# Patient Record
Sex: MALE | HISPANIC OR LATINO | URBAN - METROPOLITAN AREA
[De-identification: names, ages, dates, MRNs, and addresses within clinical notes are randomized per-mention and may not be internally consistent; named-entity substitution may affect disease eponyms.]

---
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
## 2022-02-02 NOTE — IMPRESSION/PLAN
Impression: Other chronic allergic conjunctivitis: H10.45. OU. Plan: Discussed diagnosis with patient. Recommend OTC oral antihistamine, and start Pazeo QD OU. Rub excess into lids.
0

## 2022-06-29 ENCOUNTER — OFFICE VISIT (OUTPATIENT)
Dept: URBAN - METROPOLITAN AREA CLINIC 81 | Facility: CLINIC | Age: 22
End: 2022-06-29
Payer: COMMERCIAL

## 2022-06-29 DIAGNOSIS — H52.13 MYOPIA, BILATERAL: Primary | ICD-10-CM

## 2022-06-29 PROCEDURE — 92014 COMPRE OPH EXAM EST PT 1/>: CPT | Performed by: OPTOMETRIST

## 2022-06-29 PROCEDURE — 92310 CONTACT LENS FITTING OU: CPT | Performed by: OPTOMETRIST

## 2022-06-29 ASSESSMENT — KERATOMETRY
OS: 43.38
OD: 42.38

## 2022-06-29 ASSESSMENT — INTRAOCULAR PRESSURE
OS: 12
OD: 14

## 2022-06-29 ASSESSMENT — VISUAL ACUITY
OS: 20/40
OD: 20/25

## 2022-06-29 NOTE — IMPRESSION/PLAN
Impression: Myopia, bilateral: H52.13. Plan: Discussed diagnosis with patient. Dispensed Rx for glasses and contacts to patient.

## 2023-10-31 ENCOUNTER — OFFICE VISIT (OUTPATIENT)
Dept: URBAN - METROPOLITAN AREA CLINIC 76 | Facility: CLINIC | Age: 23
End: 2023-10-31

## 2023-10-31 DIAGNOSIS — H52.13 MYOPIA, BILATERAL: Primary | ICD-10-CM

## 2023-10-31 PROCEDURE — 92310 CONTACT LENS FITTING OU: CPT | Performed by: OPTOMETRIST

## 2023-10-31 PROCEDURE — 92012 INTRM OPH EXAM EST PATIENT: CPT | Performed by: OPTOMETRIST

## 2023-10-31 ASSESSMENT — KERATOMETRY
OS: 43.13
OD: 43.25

## 2023-10-31 ASSESSMENT — VISUAL ACUITY
OS: 20/20
OD: 20/20

## 2023-10-31 ASSESSMENT — INTRAOCULAR PRESSURE
OD: 12
OS: 11